# Patient Record
Sex: FEMALE | Race: WHITE | NOT HISPANIC OR LATINO
[De-identification: names, ages, dates, MRNs, and addresses within clinical notes are randomized per-mention and may not be internally consistent; named-entity substitution may affect disease eponyms.]

---

## 2017-03-28 PROBLEM — E53.8 VITAMIN B12 DEFICIENCY: Status: RESOLVED | Noted: 2017-03-28 | Resolved: 2017-03-28

## 2017-03-28 PROBLEM — E55.9 VITAMIN D DEFICIENCY: Status: RESOLVED | Noted: 2017-03-28 | Resolved: 2017-03-28

## 2017-03-28 PROBLEM — Z86.2 HISTORY OF ANEMIA: Status: RESOLVED | Noted: 2017-03-28 | Resolved: 2017-03-28

## 2017-03-28 PROBLEM — Z86.39 HISTORY OF HYPOTHYROIDISM: Status: RESOLVED | Noted: 2017-03-28 | Resolved: 2017-03-28

## 2017-03-28 PROBLEM — Z87.39 HISTORY OF OSTEOPOROSIS: Status: RESOLVED | Noted: 2017-03-28 | Resolved: 2017-03-28

## 2017-03-29 ENCOUNTER — APPOINTMENT (OUTPATIENT)
Dept: HEMATOLOGY ONCOLOGY | Facility: CLINIC | Age: 57
End: 2017-03-29

## 2017-03-29 VITALS
HEART RATE: 63 BPM | DIASTOLIC BLOOD PRESSURE: 75 MMHG | BODY MASS INDEX: 29.81 KG/M2 | OXYGEN SATURATION: 98 % | WEIGHT: 162 LBS | SYSTOLIC BLOOD PRESSURE: 137 MMHG | HEIGHT: 62 IN

## 2017-03-29 DIAGNOSIS — E53.8 DEFICIENCY OF OTHER SPECIFIED B GROUP VITAMINS: ICD-10-CM

## 2017-03-29 DIAGNOSIS — Z00.00 ENCOUNTER FOR GENERAL ADULT MEDICAL EXAMINATION W/OUT ABNORMAL FINDINGS: ICD-10-CM

## 2017-03-29 DIAGNOSIS — Z87.39 PERSONAL HISTORY OF OTHER DISEASES OF THE MUSCULOSKELETAL SYSTEM AND CONNECTIVE TISSUE: ICD-10-CM

## 2017-03-29 DIAGNOSIS — Z86.2 PERSONAL HISTORY OF DISEASES OF THE BLOOD AND BLOOD-FORMING ORGANS AND CERTAIN DISORDERS INVOLVING THE IMMUNE MECHANISM: ICD-10-CM

## 2017-03-29 DIAGNOSIS — E55.9 VITAMIN D DEFICIENCY, UNSPECIFIED: ICD-10-CM

## 2017-03-29 DIAGNOSIS — Z86.39 PERSONAL HISTORY OF OTHER ENDOCRINE, NUTRITIONAL AND METABOLIC DISEASE: ICD-10-CM

## 2017-03-29 RX ORDER — LEVOTHYROXINE SODIUM 25 UG/1
25 TABLET ORAL DAILY
Refills: 0 | Status: DISCONTINUED | COMMUNITY
End: 2017-03-29

## 2017-04-04 PROBLEM — Z00.00 ENCOUNTER FOR PREVENTIVE HEALTH EXAMINATION: Status: ACTIVE | Noted: 2017-03-20

## 2017-09-20 ENCOUNTER — APPOINTMENT (OUTPATIENT)
Dept: HEMATOLOGY ONCOLOGY | Facility: CLINIC | Age: 57
End: 2017-09-20
Payer: COMMERCIAL

## 2017-09-20 VITALS
TEMPERATURE: 99 F | HEIGHT: 61.97 IN | OXYGEN SATURATION: 100 % | HEART RATE: 70 BPM | BODY MASS INDEX: 29.44 KG/M2 | DIASTOLIC BLOOD PRESSURE: 83 MMHG | SYSTOLIC BLOOD PRESSURE: 115 MMHG | WEIGHT: 159.99 LBS | RESPIRATION RATE: 16 BRPM

## 2017-09-20 PROCEDURE — 99213 OFFICE O/P EST LOW 20 MIN: CPT

## 2018-03-12 ENCOUNTER — APPOINTMENT (OUTPATIENT)
Dept: HEMATOLOGY ONCOLOGY | Facility: CLINIC | Age: 58
End: 2018-03-12
Payer: COMMERCIAL

## 2018-03-12 VITALS
RESPIRATION RATE: 20 BRPM | BODY MASS INDEX: 29.44 KG/M2 | HEART RATE: 69 BPM | WEIGHT: 159.99 LBS | TEMPERATURE: 98.4 F | DIASTOLIC BLOOD PRESSURE: 68 MMHG | HEIGHT: 61.97 IN | OXYGEN SATURATION: 100 % | SYSTOLIC BLOOD PRESSURE: 143 MMHG

## 2018-03-12 DIAGNOSIS — Z80.41 FAMILY HISTORY OF MALIGNANT NEOPLASM OF OVARY: ICD-10-CM

## 2018-03-12 PROCEDURE — 99215 OFFICE O/P EST HI 40 MIN: CPT

## 2018-03-21 ENCOUNTER — APPOINTMENT (OUTPATIENT)
Dept: HEMATOLOGY ONCOLOGY | Facility: CLINIC | Age: 58
End: 2018-03-21

## 2018-03-28 ENCOUNTER — OTHER (OUTPATIENT)
Age: 58
End: 2018-03-28

## 2018-04-30 ENCOUNTER — APPOINTMENT (OUTPATIENT)
Dept: HEMATOLOGY ONCOLOGY | Facility: CLINIC | Age: 58
End: 2018-04-30
Payer: COMMERCIAL

## 2018-04-30 VITALS
DIASTOLIC BLOOD PRESSURE: 88 MMHG | RESPIRATION RATE: 20 BRPM | TEMPERATURE: 98.7 F | OXYGEN SATURATION: 99 % | WEIGHT: 161 LBS | HEIGHT: 61.97 IN | HEART RATE: 59 BPM | SYSTOLIC BLOOD PRESSURE: 136 MMHG | BODY MASS INDEX: 29.63 KG/M2

## 2018-04-30 PROCEDURE — 99214 OFFICE O/P EST MOD 30 MIN: CPT

## 2018-05-16 ENCOUNTER — OTHER (OUTPATIENT)
Age: 58
End: 2018-05-16

## 2018-06-07 ENCOUNTER — RX RENEWAL (OUTPATIENT)
Age: 58
End: 2018-06-07

## 2018-07-10 ENCOUNTER — APPOINTMENT (OUTPATIENT)
Dept: HEMATOLOGY ONCOLOGY | Facility: CLINIC | Age: 58
End: 2018-07-10
Payer: COMMERCIAL

## 2018-07-10 VITALS
TEMPERATURE: 99.4 F | BODY MASS INDEX: 29.44 KG/M2 | OXYGEN SATURATION: 97 % | RESPIRATION RATE: 16 BRPM | DIASTOLIC BLOOD PRESSURE: 76 MMHG | SYSTOLIC BLOOD PRESSURE: 124 MMHG | WEIGHT: 159.99 LBS | HEIGHT: 61.97 IN | HEART RATE: 68 BPM

## 2018-07-10 PROCEDURE — 99213 OFFICE O/P EST LOW 20 MIN: CPT

## 2018-09-06 ENCOUNTER — RX RENEWAL (OUTPATIENT)
Age: 58
End: 2018-09-06

## 2018-10-10 ENCOUNTER — APPOINTMENT (OUTPATIENT)
Dept: HEMATOLOGY ONCOLOGY | Facility: CLINIC | Age: 58
End: 2018-10-10
Payer: COMMERCIAL

## 2018-10-10 VITALS
SYSTOLIC BLOOD PRESSURE: 136 MMHG | HEART RATE: 59 BPM | TEMPERATURE: 99.5 F | BODY MASS INDEX: 28.71 KG/M2 | DIASTOLIC BLOOD PRESSURE: 79 MMHG | OXYGEN SATURATION: 98 % | HEIGHT: 61.97 IN | RESPIRATION RATE: 16 BRPM | WEIGHT: 156 LBS

## 2018-10-10 PROCEDURE — 99214 OFFICE O/P EST MOD 30 MIN: CPT

## 2018-10-10 RX ORDER — NAPROXEN 500 MG/1
500 TABLET ORAL
Refills: 0 | Status: DISCONTINUED | COMMUNITY
Start: 2017-03-29 | End: 2018-10-10

## 2018-12-06 ENCOUNTER — RX RENEWAL (OUTPATIENT)
Age: 58
End: 2018-12-06

## 2019-04-10 ENCOUNTER — APPOINTMENT (OUTPATIENT)
Dept: HEMATOLOGY ONCOLOGY | Facility: CLINIC | Age: 59
End: 2019-04-10
Payer: COMMERCIAL

## 2019-04-10 VITALS
SYSTOLIC BLOOD PRESSURE: 132 MMHG | HEART RATE: 63 BPM | TEMPERATURE: 98.6 F | RESPIRATION RATE: 16 BRPM | BODY MASS INDEX: 29.81 KG/M2 | HEIGHT: 61.97 IN | OXYGEN SATURATION: 98 % | WEIGHT: 162 LBS | DIASTOLIC BLOOD PRESSURE: 83 MMHG

## 2019-04-10 PROCEDURE — 99214 OFFICE O/P EST MOD 30 MIN: CPT

## 2019-04-10 RX ORDER — EXEMESTANE 25 MG/1
25 TABLET, FILM COATED ORAL DAILY
Qty: 30 | Refills: 1 | Status: COMPLETED | COMMUNITY
Start: 2018-03-12 | End: 2019-04-10

## 2019-04-10 RX ORDER — SYRINGE WITH NEEDLE, 1 ML 25GX5/8"
25G X 5/8" SYRINGE, EMPTY DISPOSABLE MISCELLANEOUS
Qty: 12 | Refills: 3 | Status: COMPLETED | COMMUNITY
Start: 2017-03-30 | End: 2019-04-10

## 2019-04-10 NOTE — REASON FOR VISIT
[Follow-Up Visit] : a follow-up [FreeTextEntry2] : B12 deficiency, Liver Lesion, Osteopenia, LCIS/ ADH

## 2019-04-10 NOTE — HISTORY OF PRESENT ILLNESS
[Disease: _____________________] : Disease: [unfilled] [de-identified] : Patient is a 56 year old COA f/u for a liver lesion and osteopenia. She has been taking Vitamin B12 for B12 deficiency. She has a histroy of Iron Deficiency Anemia s/p IV iron. \par \par Patient underwent lumpectomy- found to have right breast density.  Breast biopsy revealed ADH.  She underwent excisional biopsy that showed ADH and LCIS.  [de-identified] : Patient presents today for follow up of LCIS/ADH B12 deficiency liver lesion and osteopenia- remains on Exemestane  Pt states she is feeing well offers no complaints.  She is s/p lithotripsy for kidney stones.

## 2019-04-10 NOTE — RESULTS/DATA
[FreeTextEntry1] : 4/18 Bone Density stable osteopenia.\par 5/18 MRI Brain\par \par 7/10/18:\par \par CEA 0.7   CA27-29 20.0 VIT D 38.8 Vit B12 471\par \par WBC 6.5 hemoglobin 12.8 hematocrit 30.7 platelets 356,000

## 2019-04-10 NOTE — CONSULT LETTER
[Dear  ___] : Dear  [unfilled], [Consult Letter:] : I had the pleasure of evaluating your patient, [unfilled]. [Please see my note below.] : Please see my note below. [Consult Closing:] : Thank you very much for allowing me to participate in the care of this patient.  If you have any questions, please do not hesitate to contact me. [Sincerely,] : Sincerely, [FreeTextEntry3] : Judy Jensen MD\par [facility] [place] at [facility]\par

## 2019-04-10 NOTE — REVIEW OF SYSTEMS
[Negative] : Allergic/Immunologic [Fever] : no fever [Fatigue] : no fatigue [Recent Change In Weight] : ~T no recent weight change [Eye Pain] : no eye pain [Vision Problems] : no vision problems [Hoarseness] : no hoarseness [Dysphagia] : no dysphagia [Mucosal Pain] : no mucosal pain [Chest Pain] : no chest pain [Lower Ext Edema] : no lower extremity edema [Cough] : no cough [SOB on Exertion] : no shortness of breath during exertion [Abdominal Pain] : no abdominal pain [Constipation] : no constipation [Diarrhea] : no diarrhea [Dysuria] : no dysuria [Joint Pain] : no joint pain [Muscle Pain] : no muscle pain [Skin Rash] : no skin rash [Dizziness] : no dizziness [Fainting] : no fainting [Insomnia] : no insomnia [Anxiety] : no anxiety [Depression] : no depression [Hot Flashes] : no hot flashes [Muscle Weakness] : no muscle weakness [Easy Bleeding] : no tendency for easy bleeding [Easy Bruising] : no tendency for easy bruising

## 2019-04-10 NOTE — ASSESSMENT
[FreeTextEntry1] : 58 year old female with  LCIS with ADH.  \par - on exemestane, occasional muscle twinges - change to Anastrozole \par - due for mammogram q 6m\par - Discussed weight control and healthy eating habits.  Encourage to participate in moderate exercise.\par \par - Ongoing surveillance with imaging studies.\par \par -  B12 deficiency - currently 471, continue B12 1000mcg Q 4 weeks SQ.  No response to PO in the past\par \par - Osteopenia - --1.8 in hip area.\par weight-bearing exercises, calcium and vitamin D\par Repeat bone density in 2 years.\par \par Liver lesion - benign\par 6 m follow up \par \par \par C/o decline in memory\par \par Platelets 427 today - repeat in 6m\par \par \par \par \par \par \par \par

## 2019-04-10 NOTE — PHYSICAL EXAM
[Fully active, able to carry on all pre-disease performance without restriction] : Status 0 - Fully active, able to carry on all pre-disease performance without restriction [Normal] : affect appropriate [de-identified] : right breast lumpectomy scar

## 2019-10-09 ENCOUNTER — RESULT REVIEW (OUTPATIENT)
Age: 59
End: 2019-10-09

## 2019-10-09 ENCOUNTER — APPOINTMENT (OUTPATIENT)
Dept: HEMATOLOGY ONCOLOGY | Facility: CLINIC | Age: 59
End: 2019-10-09
Payer: COMMERCIAL

## 2019-10-09 VITALS
TEMPERATURE: 99.5 F | WEIGHT: 170.99 LBS | RESPIRATION RATE: 16 BRPM | DIASTOLIC BLOOD PRESSURE: 85 MMHG | HEIGHT: 61.97 IN | HEART RATE: 66 BPM | OXYGEN SATURATION: 99 % | BODY MASS INDEX: 31.47 KG/M2 | SYSTOLIC BLOOD PRESSURE: 135 MMHG

## 2019-10-09 PROCEDURE — 99214 OFFICE O/P EST MOD 30 MIN: CPT

## 2019-10-09 NOTE — ASSESSMENT
[FreeTextEntry1] : 58 year old female with  LCIS with ADH 2017  \par - on exemestane, occasional muscle twinges - better on  Anastrozole \par - due for mammogram q 6m\par - genetic testing negative - repeat NEXT TIME\par - Ongoing surveillance with imaging studies.\par \par -  B12 deficiency - currently 471, continue B12 1000mcg Q 4 weeks SQ.  No response to PO in the past\par \par - Osteopenia - .8 in hip area.\par weight-bearing exercises, calcium and vitamin D\par Repeat bone density in 2020 \par \par Liver lesion - benign\par US in the past stable \par \par C/o decline in memory\par \par Platelets 427 today - repeat in 6m, 336 today \par \par Mother- multiple myeloma, ovarian cancer\par Maternal uncle - kidney failure\par Father - no cancer\par Paternal uncle-  at 17\par Paternal uncle -Charcot- Juanita Tooth \par Maternal GF- throat ca ( smoker)\par Maternal GM -  93 \par \par \par \par \par \par \par \par

## 2019-10-09 NOTE — PHYSICAL EXAM
[Fully active, able to carry on all pre-disease performance without restriction] : Status 0 - Fully active, able to carry on all pre-disease performance without restriction [Normal] : affect appropriate [de-identified] : right breast lumpectomy scar

## 2019-10-09 NOTE — REVIEW OF SYSTEMS
[Negative] : Allergic/Immunologic [Fatigue] : fatigue [Fever] : no fever [Recent Change In Weight] : ~T no recent weight change [Eye Pain] : no eye pain [Vision Problems] : no vision problems [Dysphagia] : no dysphagia [Mucosal Pain] : no mucosal pain [Chest Pain] : no chest pain [Hoarseness] : no hoarseness [Cough] : no cough [Lower Ext Edema] : no lower extremity edema [Abdominal Pain] : no abdominal pain [SOB on Exertion] : no shortness of breath during exertion [Constipation] : no constipation [Diarrhea] : no diarrhea [Dysuria] : no dysuria [Joint Pain] : no joint pain [Muscle Pain] : no muscle pain [Skin Rash] : no skin rash [Dizziness] : no dizziness [Fainting] : no fainting [Insomnia] : no insomnia [Anxiety] : no anxiety [Hot Flashes] : no hot flashes [Depression] : no depression [Muscle Weakness] : no muscle weakness [Easy Bleeding] : no tendency for easy bleeding [Easy Bruising] : no tendency for easy bruising

## 2019-10-09 NOTE — HISTORY OF PRESENT ILLNESS
[Disease: _____________________] : Disease: [unfilled] [de-identified] : Patient is a 56 year old COA f/u for a liver lesion and osteopenia. She has been taking Vitamin B12 for B12 deficiency. She has a histroy of Iron Deficiency Anemia s/p IV iron. \par \par Patient underwent lumpectomy- found to have right breast density.  Breast biopsy revealed ADH.  She underwent excisional biopsy that showed ADH and LCIS.  [de-identified] : Patient presents today for follow up of LCIS/ADH B12 deficiency liver lesion and osteopenia- remains on Exemestane  Pt states she is feeing well has some ongoing fatigue.\par Kidney stone s/p lithotripsy

## 2019-10-09 NOTE — CONSULT LETTER
[Dear  ___] : Dear  [unfilled], [Consult Letter:] : I had the pleasure of evaluating your patient, [unfilled]. [Please see my note below.] : Please see my note below. [Consult Closing:] : Thank you very much for allowing me to participate in the care of this patient.  If you have any questions, please do not hesitate to contact me. [Sincerely,] : Sincerely, [FreeTextEntry3] : Judy Jensen MD\par [facility] [place] at [facility]\par

## 2019-12-02 ENCOUNTER — RX RENEWAL (OUTPATIENT)
Age: 59
End: 2019-12-02

## 2020-04-15 ENCOUNTER — APPOINTMENT (OUTPATIENT)
Dept: HEMATOLOGY ONCOLOGY | Facility: CLINIC | Age: 60
End: 2020-04-15

## 2020-06-23 ENCOUNTER — RESULT REVIEW (OUTPATIENT)
Age: 60
End: 2020-06-23

## 2020-06-23 ENCOUNTER — APPOINTMENT (OUTPATIENT)
Dept: HEMATOLOGY ONCOLOGY | Facility: CLINIC | Age: 60
End: 2020-06-23
Payer: COMMERCIAL

## 2020-06-23 VITALS
TEMPERATURE: 98 F | BODY MASS INDEX: 30.69 KG/M2 | WEIGHT: 166.8 LBS | SYSTOLIC BLOOD PRESSURE: 135 MMHG | OXYGEN SATURATION: 99 % | RESPIRATION RATE: 18 BRPM | DIASTOLIC BLOOD PRESSURE: 70 MMHG | HEIGHT: 61.97 IN | HEART RATE: 61 BPM

## 2020-06-23 PROCEDURE — 99214 OFFICE O/P EST MOD 30 MIN: CPT

## 2020-06-23 NOTE — PHYSICAL EXAM
[Fully active, able to carry on all pre-disease performance without restriction] : Status 0 - Fully active, able to carry on all pre-disease performance without restriction [Normal] : grossly intact [de-identified] : right breast lumpectomy scar

## 2020-06-23 NOTE — REVIEW OF SYSTEMS
[Negative] : Heme/Lymph [Anxiety] : anxiety [Fatigue] : no fatigue [Fever] : no fever [Eye Pain] : no eye pain [Recent Change In Weight] : ~T no recent weight change [Vision Problems] : no vision problems [Dysphagia] : no dysphagia [Hoarseness] : no hoarseness [Mucosal Pain] : no mucosal pain [Lower Ext Edema] : no lower extremity edema [Chest Pain] : no chest pain [Cough] : no cough [Abdominal Pain] : no abdominal pain [SOB on Exertion] : no shortness of breath during exertion [Diarrhea] : no diarrhea [Constipation] : no constipation [Dysuria] : no dysuria [Muscle Pain] : no muscle pain [Joint Pain] : no joint pain [Dizziness] : no dizziness [Skin Rash] : no skin rash [Fainting] : no fainting [Depression] : no depression [Insomnia] : no insomnia [Hot Flashes] : no hot flashes [Easy Bruising] : no tendency for easy bruising [Easy Bleeding] : no tendency for easy bleeding [Muscle Weakness] : no muscle weakness [de-identified] : C/o decline in memory some fogginess  [de-identified] : work related

## 2020-06-23 NOTE — HISTORY OF PRESENT ILLNESS
[Disease: _____________________] : Disease: [unfilled] [de-identified] : Patient is a 56 year old COA f/u for a liver lesion and osteopenia. She has been taking Vitamin B12 for B12 deficiency. She has a histroy of Iron Deficiency Anemia s/p IV iron. \par \par Patient underwent lumpectomy- found to have right breast density.  Breast biopsy revealed ADH.  She underwent excisional biopsy that showed ADH and LCIS.  [de-identified] : Patient presents today for follow up of LCIS/ADH B12 deficiency liver lesion and osteopenia- remains on Exemestane  Pt states she is feeing well offers no complaints.  \par

## 2020-06-23 NOTE — ASSESSMENT
[FreeTextEntry1] : 60 year old female with  LCIS with ADH.  \par - on exemestane, occasional muscle twinges - change to Anastrozole \par - mammogram last 1/2020\par - Discussed weight control and healthy eating habits.  Encourage to participate in moderate exercise.\par \par - Ongoing surveillance with imaging studies.\par \par -  B12 deficiency - currently 471, continue B12 1000mcg Q 4 weeks SQ.  No response to PO in the past\par \par - Osteopenia - 1.8 in hip area, -2.3 lumbar spine.\par weight-bearing exercises, calcium and vitamin D\par last bone density 4/18- due now 4/2020\par \par Liver lesion - benign\par last US 1/2018\par \par

## 2020-06-23 NOTE — CONSULT LETTER
[Dear  ___] : Dear  [unfilled], [Please see my note below.] : Please see my note below. [Consult Letter:] : I had the pleasure of evaluating your patient, [unfilled]. [Consult Closing:] : Thank you very much for allowing me to participate in the care of this patient.  If you have any questions, please do not hesitate to contact me. [Sincerely,] : Sincerely, [FreeTextEntry3] : Judy Jensen MD\par [facility] [place] at [facility]\par

## 2020-12-01 ENCOUNTER — RESULT REVIEW (OUTPATIENT)
Age: 60
End: 2020-12-01

## 2020-12-01 ENCOUNTER — APPOINTMENT (OUTPATIENT)
Dept: HEMATOLOGY ONCOLOGY | Facility: CLINIC | Age: 60
End: 2020-12-01
Payer: COMMERCIAL

## 2020-12-01 VITALS
HEIGHT: 61.97 IN | BODY MASS INDEX: 30.54 KG/M2 | OXYGEN SATURATION: 97 % | SYSTOLIC BLOOD PRESSURE: 129 MMHG | DIASTOLIC BLOOD PRESSURE: 77 MMHG | HEART RATE: 72 BPM | WEIGHT: 165.99 LBS | TEMPERATURE: 97.5 F | RESPIRATION RATE: 20 BRPM

## 2020-12-01 DIAGNOSIS — Z13.79 ENCOUNTER FOR OTHER SCREENING FOR GENETIC AND CHROMOSOMAL ANOMALIES: ICD-10-CM

## 2020-12-01 PROCEDURE — 99214 OFFICE O/P EST MOD 30 MIN: CPT

## 2020-12-01 PROCEDURE — 99072 ADDL SUPL MATRL&STAF TM PHE: CPT

## 2020-12-01 NOTE — REVIEW OF SYSTEMS
[Anxiety] : anxiety [Negative] : Allergic/Immunologic [Fever] : no fever [Fatigue] : no fatigue [Recent Change In Weight] : ~T no recent weight change [Eye Pain] : no eye pain [Vision Problems] : no vision problems [Dysphagia] : no dysphagia [Hoarseness] : no hoarseness [Mucosal Pain] : no mucosal pain [Chest Pain] : no chest pain [Lower Ext Edema] : no lower extremity edema [Cough] : no cough [SOB on Exertion] : no shortness of breath during exertion [Abdominal Pain] : no abdominal pain [Constipation] : no constipation [Diarrhea] : no diarrhea [Dysuria] : no dysuria [Joint Pain] : no joint pain [Muscle Pain] : no muscle pain [Skin Rash] : no skin rash [Dizziness] : no dizziness [Fainting] : no fainting [Insomnia] : no insomnia [Depression] : no depression [Hot Flashes] : no hot flashes [Muscle Weakness] : no muscle weakness [Easy Bleeding] : no tendency for easy bleeding [Easy Bruising] : no tendency for easy bruising [de-identified] : C/o decline in memory some fogginess  [de-identified] : work related

## 2020-12-01 NOTE — CONSULT LETTER
[Dear  ___] : Dear  [unfilled], [Consult Letter:] : I had the pleasure of evaluating your patient, [unfilled]. [Please see my note below.] : Please see my note below. [Consult Closing:] : Thank you very much for allowing me to participate in the care of this patient.  If you have any questions, please do not hesitate to contact me. [Sincerely,] : Sincerely, [FreeTextEntry3] : Judy Jensen MD\par [facility] [place] at [facility]\par

## 2020-12-01 NOTE — ASSESSMENT
[FreeTextEntry1] : 60 year old female with  LCIS with ADH.  2017\par - on exemestane, occasional muscle twinges - change to Anastrozole \par - mammogram last 2020- due now \par - Discussed weight control and healthy eating habits.  Encourage to participate in moderate exercise.\par - gabapentin - 600 mg \par - Ongoing surveillance with imaging studies.\par \par -  B12 deficiency - currently 471, continue B12 1000mcg Q 4 weeks SQ.  No response to PO in the past\par \par Osteoporosis last bone density  2020\par T-score- 2.5 \par Risk factors\par Recommended:\par 1. Vitamin D\par 2. Calcium supplement 500mg\par 3. Weight bearing exercises\par 4. Prolia resume. \par Dental cleaning - regular \par \par \par weight-bearing exercises, calcium and vitamin D\par last bone density - due now 2020\par \par Liver lesion - benign, fatty liver\par last US 2018\par \par Genetic testing \par - [place]\par Patient wit LCIS age 57\par Mother - multiple myeloma - age 70, ovarian ca\par Father -  39 - accident\par Paternal uncle- age 60 Charcot- Juanita - Tooth syndrome \par Maternal uncle - CVA age 40,  young\par Maternal GF - laryngeal ca, smoker \par Brother - colon cancer age 40 \par

## 2020-12-01 NOTE — HISTORY OF PRESENT ILLNESS
[Disease: _____________________] : Disease: [unfilled] [de-identified] : Patient is a 60 year old COA f/u for a liver lesion and osteopenia. She has been taking Vitamin B12 for B12 deficiency. She has a history of Iron Deficiency Anemia s/p IV iron. \par \par Patient underwent lumpectomy in 2017 - found to have right breast density.  Breast biopsy revealed ADH.  She underwent excisional biopsy that showed ADH and LCIS.  [de-identified] : Patient presents today for follow up of LCIS/ADH B12 deficiency liver lesion and osteopenia- remains on Exemestane. Pt states she is feeing well offers no complaints.  Bone density done 7/2020. US abd done 7/2020.\par

## 2020-12-01 NOTE — PHYSICAL EXAM
[Fully active, able to carry on all pre-disease performance without restriction] : Status 0 - Fully active, able to carry on all pre-disease performance without restriction [Normal] : affect appropriate [de-identified] : right breast lumpectomy scar

## 2020-12-15 ENCOUNTER — RX RENEWAL (OUTPATIENT)
Age: 60
End: 2020-12-15

## 2021-01-21 RX ORDER — CYANOCOBALAMIN 1000 UG/ML
1000 INJECTION INTRAMUSCULAR; SUBCUTANEOUS
Qty: 12 | Refills: 0 | Status: ACTIVE | COMMUNITY
Start: 2017-03-30 | End: 1900-01-01

## 2021-06-10 ENCOUNTER — RESULT REVIEW (OUTPATIENT)
Age: 61
End: 2021-06-10

## 2021-06-10 ENCOUNTER — APPOINTMENT (OUTPATIENT)
Dept: HEMATOLOGY ONCOLOGY | Facility: CLINIC | Age: 61
End: 2021-06-10
Payer: COMMERCIAL

## 2021-06-10 VITALS
DIASTOLIC BLOOD PRESSURE: 63 MMHG | HEIGHT: 61.97 IN | OXYGEN SATURATION: 99 % | WEIGHT: 159 LBS | TEMPERATURE: 97.5 F | RESPIRATION RATE: 16 BRPM | BODY MASS INDEX: 29.26 KG/M2 | SYSTOLIC BLOOD PRESSURE: 132 MMHG | HEART RATE: 64 BPM

## 2021-06-10 PROCEDURE — 99214 OFFICE O/P EST MOD 30 MIN: CPT

## 2021-06-10 PROCEDURE — 99072 ADDL SUPL MATRL&STAF TM PHE: CPT

## 2021-06-10 NOTE — HISTORY OF PRESENT ILLNESS
[Disease: _____________________] : Disease: [unfilled] [de-identified] : Patient is a 60 year old COA f/u for a liver lesion and osteopenia. She has been taking Vitamin B12 for B12 deficiency. She has a history of Iron Deficiency Anemia s/p IV iron. \par \par Patient underwent lumpectomy in 2017 - found to have right breast density.  Breast biopsy revealed ADH.  She underwent excisional biopsy that showed ADH and LCIS.  [de-identified] : Patient presents today for follow up of LCIS/ADH B12 deficiency liver lesion and osteopenia- remains on Exemestane. Pt states she is feeing well offers no complaints.  Bone density done 7/2020. US abd done 7/2020.\par Patient received prolia in Dec 2020, patient states that she was having severe pain in the mouth and back of the knees post shot.\par  \par Patient also had a mammogram and ultrasound in Feb, 2021, which showed that the lymph node were swollen.

## 2021-06-10 NOTE — ASSESSMENT
[FreeTextEntry1] : 60 year old female with  LCIS with ADH.  2017\par - on exemestane, occasional muscle twinges - change to Anastrozole \par - mammogram last 2020- due now \par - Discussed weight control and healthy eating habits.  Encourage to participate in moderate exercise.\par - gabapentin - 600 mg \par - Ongoing surveillance with imaging studies.\par \par -  B12 deficiency - currently 471, continue B12 1000mcg Q 4 weeks SQ.  No response to PO in the past\par \par Osteoporosis last bone density  2020\par T-score- 2.5 \par Risk factors\par Recommended:\par 1. Vitamin D\par 2. Calcium supplement 500mg\par 3. Weight bearing exercises\par 4. Prolia resume. \par Dental cleaning - regular \par \par weight-bearing exercises, calcium and vitamin D\par last bone density - due now 2020\par \par Liver lesion - benign, fatty liver\par last US 2018\par \par Genetic testing \par - Indonesian\par Patient wit LCIS age 57\par Mother - multiple myeloma - age 70, ovarian ca ? \par Father -  39 - accident\par Paternal uncle- age 60 Charcot- Juanita - Tooth syndrome \par Maternal uncle - CVA age 40,  young\par Maternal GF - laryngeal ca, smoker \par Brother - colon cancer age 40 \par \par 6/10/2021\par Patient with tingling sensation in the tongue after Prolia for few weeks, followed by tingling sensation after COVID vaccine ( Moderna).  She was not taking calcium.  Start Calcium \par Genetic testing reviewed with patient - [facility] results with SMARCA4 VUS, patient referred to genetic counselor.   \par Sonogram 2020 liver stable \par LCIS - on Arimidex, continue, dexa 2020. Mammogram- 2021\par

## 2021-06-10 NOTE — REVIEW OF SYSTEMS
[Anxiety] : anxiety [Negative] : Musculoskeletal [Fever] : no fever [Fatigue] : no fatigue [Recent Change In Weight] : ~T no recent weight change [Eye Pain] : no eye pain [Vision Problems] : no vision problems [Hoarseness] : no hoarseness [Dysphagia] : no dysphagia [Mucosal Pain] : no mucosal pain [Chest Pain] : no chest pain [Lower Ext Edema] : no lower extremity edema [Cough] : no cough [SOB on Exertion] : no shortness of breath during exertion [Abdominal Pain] : no abdominal pain [Constipation] : no constipation [Diarrhea] : no diarrhea [Dysuria] : no dysuria [Joint Pain] : no joint pain [Muscle Pain] : no muscle pain [Skin Rash] : no skin rash [Dizziness] : no dizziness [Fainting] : no fainting [Insomnia] : no insomnia [Depression] : no depression [Hot Flashes] : no hot flashes [Muscle Weakness] : no muscle weakness [Easy Bleeding] : no tendency for easy bleeding [Easy Bruising] : no tendency for easy bruising [de-identified] : work related

## 2021-06-10 NOTE — CONSULT LETTER
[Dear  ___] : Dear  [unfilled], [Consult Letter:] : I had the pleasure of evaluating your patient, [unfilled]. [Please see my note below.] : Please see my note below. [Consult Closing:] : Thank you very much for allowing me to participate in the care of this patient.  If you have any questions, please do not hesitate to contact me. [Sincerely,] : Sincerely, [FreeTextEntry3] : Judy Jensen MD\par [facility] [place] at [facility]\par

## 2021-06-10 NOTE — PHYSICAL EXAM
[Fully active, able to carry on all pre-disease performance without restriction] : Status 0 - Fully active, able to carry on all pre-disease performance without restriction [Normal] : affect appropriate [de-identified] : right breast lumpectomy scar

## 2021-07-07 ENCOUNTER — APPOINTMENT (OUTPATIENT)
Dept: HEMATOLOGY ONCOLOGY | Facility: CLINIC | Age: 61
End: 2021-07-07

## 2021-12-16 ENCOUNTER — RESULT REVIEW (OUTPATIENT)
Age: 61
End: 2021-12-16

## 2021-12-16 ENCOUNTER — APPOINTMENT (OUTPATIENT)
Dept: HEMATOLOGY ONCOLOGY | Facility: CLINIC | Age: 61
End: 2021-12-16
Payer: COMMERCIAL

## 2021-12-16 VITALS
SYSTOLIC BLOOD PRESSURE: 128 MMHG | HEIGHT: 61.97 IN | WEIGHT: 158.5 LBS | RESPIRATION RATE: 16 BRPM | OXYGEN SATURATION: 98 % | TEMPERATURE: 98.6 F | HEART RATE: 67 BPM | BODY MASS INDEX: 29.17 KG/M2 | DIASTOLIC BLOOD PRESSURE: 71 MMHG

## 2021-12-16 DIAGNOSIS — M25.562 PAIN IN RIGHT HIP: ICD-10-CM

## 2021-12-16 DIAGNOSIS — M25.552 PAIN IN RIGHT HIP: ICD-10-CM

## 2021-12-16 DIAGNOSIS — G89.29 PAIN IN RIGHT HIP: ICD-10-CM

## 2021-12-16 DIAGNOSIS — M25.551 PAIN IN RIGHT HIP: ICD-10-CM

## 2021-12-16 DIAGNOSIS — M25.561 PAIN IN RIGHT HIP: ICD-10-CM

## 2021-12-16 PROCEDURE — 36415 COLL VENOUS BLD VENIPUNCTURE: CPT

## 2021-12-16 PROCEDURE — 99214 OFFICE O/P EST MOD 30 MIN: CPT

## 2021-12-16 NOTE — PHYSICAL EXAM
[Fully active, able to carry on all pre-disease performance without restriction] : Status 0 - Fully active, able to carry on all pre-disease performance without restriction [Normal] : affect appropriate [de-identified] : right breast lumpectomy scar

## 2021-12-16 NOTE — ASSESSMENT
[FreeTextEntry1] : 61 year old female with  LCIS with ADH.  2017\par - on exemestane, occasional muscle twinges - change to Anastrozole \par - mammogram last 2020- due now \par - Discussed weight control and healthy eating habits.  Encourage to participate in moderate exercise.\par - gabapentin - 600 mg \par - Ongoing surveillance with imaging studies.\par \par -  B12 deficiency - check level, continue B12 1000mcg Q 4 weeks SQ.  No response to PO in the past\par \par Osteoporosis last bone density  2020\par T-score- 2.5 \par Risk factors\par Recommended:\par 1. Vitamin D\par 2. Calcium supplement 500mg\par 3. Weight bearing exercises\par 4. Prolia resume. , 2021, 2021\par Dental cleaning - regular \par \par weight-bearing exercises, calcium and vitamin D\par last bone density - due now 2020\par \par Liver lesion - benign, fatty liver\par last US 10/21- reviewed \par \par Genetic testing \par - [place]ag[place]\par Patient wit LCIS age 57\par Mother - multiple myeloma - age 70, ovarian ca ? \par Father -  39 - accident\par Paternal uncle- age 60 Charcot- Juanita - Tooth syndrome \par Maternal uncle - CVA age 40,  young\par Maternal GF - laryngeal ca, smoker \par Brother - colon cancer age 40 \par \par Patient with tingling sensation in the tongue after Prolia for few weeks, followed by tingling sensation after COVID vaccine ( Moderna).  She was not taking calcium.  Start Calcium \par Genetic testing reviewed with patient - [facility] results with SMARCA4 VUS, patient referred to genetic counselor.   \par \par \par

## 2021-12-16 NOTE — HISTORY OF PRESENT ILLNESS
[Disease: _____________________] : Disease: [unfilled] [de-identified] : Patient is a 60 year old COA f/u for a liver lesion and osteopenia. She has been taking Vitamin B12 for B12 deficiency. She has a history of Iron Deficiency Anemia s/p IV iron. \par \par Patient underwent lumpectomy in 2017 - found to have right breast density.  Breast biopsy revealed ADH.  She underwent excisional biopsy that showed ADH and LCIS.  [de-identified] : Patient presents today for follow up of LCIS/ADH B12 deficiency liver lesion and osteopenia- remains on Exemestane. Pt states she is feeing well offers no complaints.  Bone density done 7/2020. US abd done 7/2020.\par Patient received prolia in Dec 2020, patient states that she was having severe pain in the mouth and back of the knees post shot.\par  \par Patient also had a mammogram and ultrasound in Feb, 2021, which showed that the lymph node were swollen.

## 2021-12-16 NOTE — CONSULT LETTER
[Dear  ___] : Dear  [unfilled], [Consult Letter:] : I had the pleasure of evaluating your patient, [unfilled]. [Please see my note below.] : Please see my note below. [Consult Closing:] : Thank you very much for allowing me to participate in the care of this patient.  If you have any questions, please do not hesitate to contact me. [Sincerely,] : Sincerely, [FreeTextEntry3] : Judy Jensen MD\par [facility] [place] at [facility]\par

## 2021-12-16 NOTE — REVIEW OF SYSTEMS
[Anxiety] : anxiety [Negative] : Allergic/Immunologic [Fever] : no fever [Fatigue] : no fatigue [Recent Change In Weight] : ~T no recent weight change [Eye Pain] : no eye pain [Vision Problems] : no vision problems [Dysphagia] : no dysphagia [Hoarseness] : no hoarseness [Mucosal Pain] : no mucosal pain [Chest Pain] : no chest pain [Lower Ext Edema] : no lower extremity edema [Cough] : no cough [SOB on Exertion] : no shortness of breath during exertion [Abdominal Pain] : no abdominal pain [Constipation] : no constipation [Diarrhea] : no diarrhea [Dysuria] : no dysuria [Joint Pain] : no joint pain [Muscle Pain] : no muscle pain [Skin Rash] : no skin rash [Dizziness] : no dizziness [Fainting] : no fainting [Insomnia] : no insomnia [Depression] : no depression [Hot Flashes] : no hot flashes [Muscle Weakness] : no muscle weakness [Easy Bleeding] : no tendency for easy bleeding [Easy Bruising] : no tendency for easy bruising [de-identified] : work related

## 2022-06-16 ENCOUNTER — RESULT REVIEW (OUTPATIENT)
Age: 62
End: 2022-06-16

## 2022-06-16 ENCOUNTER — APPOINTMENT (OUTPATIENT)
Dept: HEMATOLOGY ONCOLOGY | Facility: CLINIC | Age: 62
End: 2022-06-16
Payer: COMMERCIAL

## 2022-06-16 VITALS
DIASTOLIC BLOOD PRESSURE: 72 MMHG | RESPIRATION RATE: 16 BRPM | WEIGHT: 157.13 LBS | BODY MASS INDEX: 28.91 KG/M2 | TEMPERATURE: 98.7 F | SYSTOLIC BLOOD PRESSURE: 141 MMHG | OXYGEN SATURATION: 99 % | HEIGHT: 61.97 IN | HEART RATE: 55 BPM

## 2022-06-16 PROCEDURE — 99214 OFFICE O/P EST MOD 30 MIN: CPT | Mod: 25

## 2022-06-16 PROCEDURE — 36415 COLL VENOUS BLD VENIPUNCTURE: CPT

## 2022-06-16 RX ORDER — TACROLIMUS 1 MG/G
0.1 OINTMENT TOPICAL
Qty: 60 | Refills: 0 | Status: COMPLETED | COMMUNITY
Start: 2022-01-19

## 2022-06-16 RX ORDER — METRONIDAZOLE 7.5 MG/G
0.75 CREAM TOPICAL
Qty: 45 | Refills: 0 | Status: COMPLETED | COMMUNITY
Start: 2022-01-19

## 2022-06-16 RX ORDER — OXYMETAZOLINE HYDROCHLORIDE 1 G/100G
1 CREAM TOPICAL
Qty: 30 | Refills: 0 | Status: COMPLETED | COMMUNITY
Start: 2022-01-19

## 2022-06-18 NOTE — HISTORY OF PRESENT ILLNESS
[Disease: _____________________] : Disease: [unfilled] [de-identified] : Patient is a 60 year old COA f/u for a liver lesion and osteopenia. She has been taking Vitamin B12 for B12 deficiency. She has a history of Iron Deficiency Anemia s/p IV iron. \par \par Patient underwent lumpectomy in 2017 - found to have right breast density.  Breast biopsy revealed ADH.  She underwent excisional biopsy that showed ADH and LCIS.  [de-identified] : Patient presents today for follow up of LCIS/ADH B12 deficiency liver lesion and osteopenia- remains on Anastrozole. Patient offers no physical complaints at this time.

## 2022-06-18 NOTE — ASSESSMENT
[FreeTextEntry1] : 62 year old female with  LCIS with ADH.  2017\par - on exemestane, occasional muscle twinges - change to Anastrozole \par - mammogram last  2022\par - Discussed weight control and healthy eating habits.  Encourage to participate in moderate exercise.\par - gabapentin - 600 mg \par - Ongoing surveillance with imaging studies.\par \par -  B12 deficiency - check level, continue B12 1000mcg Q 4 weeks SQ.  No response to PO in the past\par \par Osteoporosis last bone density  2020\par T-score- 2.5 \par Risk factors\par Recommended:\par 1. Vitamin D\par 2. Calcium supplement 500mg\par 3. Weight bearing exercises\par 4. Prolia resume. , 2021, 2021, 2022- hold, right jaw pain, might need wisdom tooth removal\par Dental cleaning - regular weight-bearing exercises, calcium and vitamin D\par last bone density - due now \par \par Liver lesion - benign, fatty liver\par last US 10/21- reviewed \par \par Genetic testing \par - [place]\par Patient wit LCIS age 57\par Mother - multiple myeloma - age 70, ovarian ca ? \par Father -  39 - accident\par Paternal uncle- age 60 Charcot- Juanita - Tooth syndrome \par Maternal uncle - CVA age 40,  young\par Maternal GF - laryngeal ca, smoker \par Brother - colon cancer age 40 \par \par Patient with tingling sensation in the tongue after Prolia for few weeks, followed by tingling sensation after COVID vaccine ( Moderna).  She was not taking calcium.  Start Calcium \par Genetic testing reviewed with patient - [facility] results with SMARCA4 VUS, patient evaluated by genetic counselor.   \par \par \par

## 2022-06-18 NOTE — CONSULT LETTER
[Dear  ___] : Dear  [unfilled], [Consult Letter:] : I had the pleasure of evaluating your patient, [unfilled]. [Please see my note below.] : Please see my note below. [Consult Closing:] : Thank you very much for allowing me to participate in the care of this patient.  If you have any questions, please do not hesitate to contact me. [Sincerely,] : Sincerely, [FreeTextEntry3] : Judy Jensen MD\par [facility] [place] at [facility]\par

## 2022-06-18 NOTE — PHYSICAL EXAM
[Fully active, able to carry on all pre-disease performance without restriction] : Status 0 - Fully active, able to carry on all pre-disease performance without restriction [Normal] : affect appropriate [de-identified] : right breast lumpectomy scar

## 2022-09-20 ENCOUNTER — RESULT REVIEW (OUTPATIENT)
Age: 62
End: 2022-09-20

## 2022-09-20 ENCOUNTER — APPOINTMENT (OUTPATIENT)
Dept: HEMATOLOGY ONCOLOGY | Facility: CLINIC | Age: 62
End: 2022-09-20

## 2022-09-20 VITALS
SYSTOLIC BLOOD PRESSURE: 133 MMHG | RESPIRATION RATE: 16 BRPM | TEMPERATURE: 98.2 F | HEIGHT: 61.97 IN | WEIGHT: 160.6 LBS | DIASTOLIC BLOOD PRESSURE: 70 MMHG | HEART RATE: 64 BPM | BODY MASS INDEX: 29.55 KG/M2 | OXYGEN SATURATION: 99 %

## 2022-09-20 PROCEDURE — 99213 OFFICE O/P EST LOW 20 MIN: CPT | Mod: 25

## 2022-09-20 PROCEDURE — 36415 COLL VENOUS BLD VENIPUNCTURE: CPT

## 2022-09-20 NOTE — HISTORY OF PRESENT ILLNESS
[Disease: _____________________] : Disease: [unfilled] [de-identified] : Patient is a 60 year old COA f/u for a liver lesion and osteopenia. She has been taking Vitamin B12 for B12 deficiency. She has a history of Iron Deficiency Anemia s/p IV iron. \par \par Patient underwent lumpectomy in 2017 - found to have right breast density.  Breast biopsy revealed ADH.  She underwent excisional biopsy that showed ADH and LCIS.  [de-identified] : Patient presents today for follow up of LCIS/ADH B12 deficiency liver lesion and osteopenia- remains on Anastrozole. Patient offers no physical complaints at this time.

## 2022-09-20 NOTE — REVIEW OF SYSTEMS
[Anxiety] : anxiety [Negative] : Allergic/Immunologic [Fever] : no fever [Fatigue] : no fatigue [Recent Change In Weight] : ~T no recent weight change [Eye Pain] : no eye pain [Vision Problems] : no vision problems [Dysphagia] : no dysphagia [Hoarseness] : no hoarseness [Mucosal Pain] : no mucosal pain [Chest Pain] : no chest pain [Lower Ext Edema] : no lower extremity edema [Cough] : no cough [SOB on Exertion] : no shortness of breath during exertion [Abdominal Pain] : no abdominal pain [Constipation] : no constipation [Diarrhea] : no diarrhea [Dysuria] : no dysuria [Joint Pain] : no joint pain [Muscle Pain] : no muscle pain [Skin Rash] : no skin rash [Dizziness] : no dizziness [Fainting] : no fainting [Insomnia] : no insomnia [Depression] : no depression [Hot Flashes] : no hot flashes [Muscle Weakness] : no muscle weakness [Easy Bleeding] : no tendency for easy bleeding [Easy Bruising] : no tendency for easy bruising [de-identified] : work related

## 2022-09-20 NOTE — ASSESSMENT
[FreeTextEntry1] : 62 year old female with  LCIS with ADH.  2017\par - on exemestane, occasional muscle twinges - change to Anastrozole \par - mammogram last  2022\par - Discussed weight control and healthy eating habits.  Encourage to participate in moderate exercise.\par - gabapentin - 600 mg \par - Ongoing surveillance with imaging studies.\par \par -  B12 deficiency - check level, continue B12 1000mcg Q 4 weeks SQ.  No response to PO in the past\par \par Osteoporosis last bone density 2022\par T-score- 2.5 to -1.8\par Risk factors\par Recommended:\par 1. Vitamin D\par 2. Calcium supplement 500mg\par 3. Weight bearing exercises\par 4. Prolia resume. , 2021, 2021, 2022- (held) will resume now \par Dental cleaning - regular weight-bearing exercises, calcium and vitamin D\par \par Liver lesion - benign, fatty liver\par last  10/21- reviewed \par \par Genetic testing \par - [place]\par Patient wit LCIS age 57\par Mother - multiple myeloma - age 70, ovarian ca ? \par Father -  39 - accident\par Paternal uncle- age 60 Charcot- Juanita - Tooth syndrome \par Maternal uncle - CVA age 40,  young\par Maternal GF - laryngeal ca, smoker \par Brother - colon cancer age 40 \par \par Patient with tingling sensation in the tongue after Prolia for few weeks, followed by tingling sensation after COVID vaccine ( Moderna).  She was not taking calcium.  Start Calcium \par Genetic testing reviewed with patient - [facility] results with SMARCA4 VUS, patient evaluated by genetic counselor.   \par \par Case and mgmt discussed with Dr. Jensen- cbc cea, breast profile vit d, irons and b12 \par

## 2022-09-20 NOTE — PHYSICAL EXAM
[Fully active, able to carry on all pre-disease performance without restriction] : Status 0 - Fully active, able to carry on all pre-disease performance without restriction [Normal] : affect appropriate [de-identified] : right breast lumpectomy scar

## 2022-09-20 NOTE — CONSULT LETTER
[Dear  ___] : Dear  [unfilled], [Consult Letter:] : I had the pleasure of evaluating your patient, [unfilled]. [Please see my note below.] : Please see my note below. [Consult Closing:] : Thank you very much for allowing me to participate in the care of this patient.  If you have any questions, please do not hesitate to contact me. [Sincerely,] : Sincerely, [FreeTextEntry3] : Judy Jensen MD\par [facility] [place] at [facility]\par

## 2023-03-21 ENCOUNTER — RESULT REVIEW (OUTPATIENT)
Age: 63
End: 2023-03-21

## 2023-03-21 ENCOUNTER — APPOINTMENT (OUTPATIENT)
Dept: HEMATOLOGY ONCOLOGY | Facility: CLINIC | Age: 63
End: 2023-03-21
Payer: COMMERCIAL

## 2023-03-21 VITALS
TEMPERATURE: 98.1 F | OXYGEN SATURATION: 98 % | HEIGHT: 61.97 IN | HEART RATE: 65 BPM | SYSTOLIC BLOOD PRESSURE: 132 MMHG | BODY MASS INDEX: 30.42 KG/M2 | WEIGHT: 165.31 LBS | DIASTOLIC BLOOD PRESSURE: 67 MMHG | RESPIRATION RATE: 16 BRPM

## 2023-03-21 DIAGNOSIS — K76.9 LIVER DISEASE, UNSPECIFIED: ICD-10-CM

## 2023-03-21 PROCEDURE — 36415 COLL VENOUS BLD VENIPUNCTURE: CPT

## 2023-03-21 PROCEDURE — 99213 OFFICE O/P EST LOW 20 MIN: CPT | Mod: 25

## 2023-03-21 NOTE — PHYSICAL EXAM
[Fully active, able to carry on all pre-disease performance without restriction] : Status 0 - Fully active, able to carry on all pre-disease performance without restriction [Normal] : affect appropriate [de-identified] : right breast lumpectomy scar

## 2023-03-21 NOTE — REVIEW OF SYSTEMS
[Anxiety] : anxiety [Negative] : Allergic/Immunologic [Fatigue] : fatigue [Fever] : no fever [Recent Change In Weight] : ~T no recent weight change [Eye Pain] : no eye pain [Vision Problems] : no vision problems [Dysphagia] : no dysphagia [Hoarseness] : no hoarseness [Mucosal Pain] : no mucosal pain [Chest Pain] : no chest pain [Lower Ext Edema] : no lower extremity edema [Cough] : no cough [SOB on Exertion] : no shortness of breath during exertion [Abdominal Pain] : no abdominal pain [Constipation] : no constipation [Diarrhea] : no diarrhea [Dysuria] : no dysuria [Joint Pain] : no joint pain [Muscle Pain] : no muscle pain [Skin Rash] : no skin rash [Dizziness] : no dizziness [Fainting] : no fainting [Insomnia] : no insomnia [Depression] : no depression [Hot Flashes] : no hot flashes [Muscle Weakness] : no muscle weakness [Easy Bleeding] : no tendency for easy bleeding [Easy Bruising] : no tendency for easy bruising [de-identified] : work related

## 2023-03-21 NOTE — ASSESSMENT
[FreeTextEntry1] : 62 year old female with  LCIS with ADH.  2017\par - on exemestane, occasional muscle twinges - change to Anastrozole- tolerates well \par - mammogram last  2023\par - Discussed weight control and healthy eating habits.  Encourage to participate in moderate exercise.\par - gabapentin - 600 mg \par - Ongoing surveillance with imaging studies.\par \par -  B12 deficiency - check level, continue B12 1000mcg Q 4 weeks SQ.  No response to PO in the past- 654 at PCP 2023\par \par Osteoporosis last bone density 2022\par T-score- 2.5 to -1.8\par Risk factors\par Recommended:\par 1. Vitamin D\par 2. Calcium supplement 500mg\par 3. Weight bearing exercises\par 4. Prolia resume. , 2021, 2021, 2022- (held) will resume now \par Dental cleaning - regular weight-bearing exercises, calcium and vitamin D\par \par Liver lesion - benign, fatty liver\par last  10/21- reviewed \par \par Genetic testing \par - [place]\par Patient wit LCIS age 57\par Mother - multiple myeloma - age 70, ovarian ca ? \par Father -  39 - accident\par Paternal uncle- age 60 Charcot- Juanita - Tooth syndrome \par Maternal uncle - CVA age 40,  young\par Maternal GF - laryngeal ca, smoker \par Brother - colon cancer age 40 \par \par Patient with tingling sensation in the tongue after Prolia for few weeks, followed by tingling sensation after COVID vaccine ( Moderna).  She was not taking calcium.  Start Calcium \par Genetic testing reviewed with patient - [facility] results with SMARCA4 VUS, patient evaluated by genetic counselor.   \par \par Case and mgmt discussed with Dr. Jensen- cbc cea, breast profile vit d, irons and b12 \par

## 2023-03-21 NOTE — HISTORY OF PRESENT ILLNESS
[Disease: _____________________] : Disease: [unfilled] [de-identified] : Patient is a 60 year old COA f/u for a liver lesion and osteopenia. She has been taking Vitamin B12 for B12 deficiency. She has a history of Iron Deficiency Anemia s/p IV iron. \par \par Patient underwent lumpectomy in 2017 - found to have right breast density.  Breast biopsy revealed ADH.  She underwent excisional biopsy that showed ADH and LCIS.  [de-identified] : Patient presents today for follow up of LCIS/ADH B12 deficiency liver lesion and osteopenia- remains on Anastrozole. Patient offers no physical complaints at this time.

## 2023-03-21 NOTE — CONSULT LETTER
[Dear  ___] : Dear  [unfilled], [Consult Letter:] : I had the pleasure of evaluating your patient, [unfilled]. [Please see my note below.] : Please see my note below. [Consult Closing:] : Thank you very much for allowing me to participate in the care of this patient.  If you have any questions, please do not hesitate to contact me. [Sincerely,] : Sincerely, [FreeTextEntry3] : Judy Jensen MD\par [facility] [place] at [facility]\par

## 2023-09-08 NOTE — HISTORY OF PRESENT ILLNESS
[Disease: _____________________] : Disease: [unfilled] [de-identified] : Patient is a 60 year old COA f/u for a liver lesion and osteopenia. She has been taking Vitamin B12 for B12 deficiency. She has a history of Iron Deficiency Anemia s/p IV iron. \par  \par  Patient underwent lumpectomy in 2017 - found to have right breast density.  Breast biopsy revealed ADH.  She underwent excisional biopsy that showed ADH and LCIS.  [de-identified] : Patient presents today for follow up of LCIS/ADH B12 deficiency liver lesion and osteopenia- remains on Anastrozole. Patient offers no physical complaints at this time.

## 2023-09-08 NOTE — REVIEW OF SYSTEMS
[Fever] : no fever [Fatigue] : fatigue [Recent Change In Weight] : ~T no recent weight change [Eye Pain] : no eye pain [Vision Problems] : no vision problems [Dysphagia] : no dysphagia [Hoarseness] : no hoarseness [Mucosal Pain] : no mucosal pain [Chest Pain] : no chest pain [Lower Ext Edema] : no lower extremity edema [Cough] : no cough [SOB on Exertion] : no shortness of breath during exertion [Abdominal Pain] : no abdominal pain [Constipation] : no constipation [Dysuria] : no dysuria [Joint Pain] : no joint pain [Muscle Pain] : no muscle pain [Skin Rash] : no skin rash [Dizziness] : no dizziness [Fainting] : no fainting [Insomnia] : no insomnia [Anxiety] : anxiety [Depression] : no depression [Hot Flashes] : no hot flashes [Muscle Weakness] : no muscle weakness [Easy Bleeding] : no tendency for easy bleeding [Easy Bruising] : no tendency for easy bruising [Negative] : Allergic/Immunologic [de-identified] : work related

## 2023-09-08 NOTE — ASSESSMENT
[FreeTextEntry1] : 62 year old female with  LCIS with ADH.  2017\par  - on exemestane, occasional muscle twinges - change to Anastrozole- tolerates well \par  - mammogram last  2023\par  - Discussed weight control and healthy eating habits.  Encourage to participate in moderate exercise.\par  - gabapentin - 600 mg \par  - Ongoing surveillance with imaging studies.\par  \par  -  B12 deficiency - check level, continue B12 1000mcg Q 4 weeks SQ.  No response to PO in the past- 654 at PCP 2023\par  \par  Osteoporosis last bone density 2022\par  T-score- 2.5 to -1.8\par  Risk factors\par  Recommended:\par  1. Vitamin D\par  2. Calcium supplement 500mg\par  3. Weight bearing exercises\par  4. Prolia resume. , 2021, 2021, 2022- (held) will resume now \par  Dental cleaning - regular weight-bearing exercises, calcium and vitamin D\par  \par  Liver lesion - benign, fatty liver\par  last  10/21- reviewed \par  \par  Genetic testing \par  - [place]\par  Patient wit LCIS age 57\par  Mother - multiple myeloma - age 70, ovarian ca ? \par  Father -  39 - accident\par  Paternal uncle- age 60 Charcot- Juanita - Tooth syndrome \par  Maternal uncle - CVA age 40,  young\par  Maternal GF - laryngeal ca, smoker \par  Brother - colon cancer age 40 \par  \par  Patient with tingling sensation in the tongue after Prolia for few weeks, followed by tingling sensation after COVID vaccine ( Moderna).  She was not taking calcium.  Start Calcium \par  Genetic testing reviewed with patient - [facility] results with SMARCA4 VUS, patient evaluated by genetic counselor.   \par  \par  Case and mgmt discussed with Dr. Jensen- cbc cea, breast profile vit d, irons and b12 \par

## 2023-09-08 NOTE — PHYSICAL EXAM
[Fully active, able to carry on all pre-disease performance without restriction] : Status 0 - Fully active, able to carry on all pre-disease performance without restriction [Normal] : affect appropriate [de-identified] : right breast lumpectomy scar

## 2023-09-08 NOTE — CONSULT LETTER
[Dear  ___] : Dear  [unfilled], [Consult Letter:] : I had the pleasure of evaluating your patient, [unfilled]. [Please see my note below.] : Please see my note below. [Consult Closing:] : Thank you very much for allowing me to participate in the care of this patient.  If you have any questions, please do not hesitate to contact me. [Sincerely,] : Sincerely, [FreeTextEntry3] : Judy Jensen MD\par  [facility] [place] at [facility]\par

## 2023-09-12 ENCOUNTER — RESULT REVIEW (OUTPATIENT)
Age: 63
End: 2023-09-12

## 2023-09-12 ENCOUNTER — APPOINTMENT (OUTPATIENT)
Dept: HEMATOLOGY ONCOLOGY | Facility: CLINIC | Age: 63
End: 2023-09-12
Payer: COMMERCIAL

## 2023-09-12 VITALS
TEMPERATURE: 96.9 F | HEIGHT: 61.97 IN | WEIGHT: 166 LBS | HEART RATE: 58 BPM | DIASTOLIC BLOOD PRESSURE: 69 MMHG | RESPIRATION RATE: 16 BRPM | SYSTOLIC BLOOD PRESSURE: 130 MMHG | BODY MASS INDEX: 30.55 KG/M2 | OXYGEN SATURATION: 97 %

## 2023-09-12 DIAGNOSIS — R30.0 DYSURIA: ICD-10-CM

## 2023-09-12 PROCEDURE — 99214 OFFICE O/P EST MOD 30 MIN: CPT | Mod: 25

## 2023-09-12 PROCEDURE — 36415 COLL VENOUS BLD VENIPUNCTURE: CPT

## 2023-09-12 RX ORDER — ATORVASTATIN CALCIUM 20 MG/1
20 TABLET, FILM COATED ORAL
Refills: 0 | Status: ACTIVE | COMMUNITY

## 2023-09-12 RX ORDER — ANASTROZOLE TABLETS 1 MG/1
1 TABLET ORAL DAILY
Qty: 90 | Refills: 3 | Status: ACTIVE | COMMUNITY
Start: 2019-04-10 | End: 1900-01-01

## 2023-12-07 RX ORDER — GABAPENTIN 300 MG/1
300 CAPSULE ORAL
Qty: 90 | Refills: 2 | Status: ACTIVE | COMMUNITY
Start: 1900-01-01 | End: 1900-01-01

## 2024-03-11 ENCOUNTER — RX RENEWAL (OUTPATIENT)
Age: 64
End: 2024-03-11

## 2024-03-12 ENCOUNTER — RESULT REVIEW (OUTPATIENT)
Age: 64
End: 2024-03-12

## 2024-03-12 ENCOUNTER — APPOINTMENT (OUTPATIENT)
Dept: HEMATOLOGY ONCOLOGY | Facility: CLINIC | Age: 64
End: 2024-03-12
Payer: COMMERCIAL

## 2024-03-12 VITALS
OXYGEN SATURATION: 98 % | BODY MASS INDEX: 31.47 KG/M2 | SYSTOLIC BLOOD PRESSURE: 130 MMHG | TEMPERATURE: 97 F | WEIGHT: 171 LBS | RESPIRATION RATE: 16 BRPM | HEIGHT: 61.97 IN | HEART RATE: 64 BPM | DIASTOLIC BLOOD PRESSURE: 70 MMHG

## 2024-03-12 DIAGNOSIS — D51.9 VITAMIN B12 DEFICIENCY ANEMIA, UNSPECIFIED: ICD-10-CM

## 2024-03-12 DIAGNOSIS — R07.89 OTHER CHEST PAIN: ICD-10-CM

## 2024-03-12 DIAGNOSIS — D05.01 LOBULAR CARCINOMA IN SITU OF RIGHT BREAST: ICD-10-CM

## 2024-03-12 DIAGNOSIS — M85.80 OTHER SPECIFIED DISORDERS OF BONE DENSITY AND STRUCTURE, UNSPECIFIED SITE: ICD-10-CM

## 2024-03-12 DIAGNOSIS — M81.8 OTHER OSTEOPOROSIS W/OUT CURRENT PATHOLOGICAL FRACTURE: ICD-10-CM

## 2024-03-12 PROCEDURE — 99214 OFFICE O/P EST MOD 30 MIN: CPT

## 2024-03-12 PROCEDURE — 36415 COLL VENOUS BLD VENIPUNCTURE: CPT

## 2024-03-12 RX ORDER — SYRINGE WITH NEEDLE, 1 ML 25GX5/8"
25G X 5/8" SYRINGE, EMPTY DISPOSABLE MISCELLANEOUS
Qty: 20 | Refills: 1 | Status: ACTIVE | COMMUNITY
Start: 1900-01-01 | End: 1900-01-01

## 2024-03-12 RX ORDER — CYANOCOBALAMIN 1000 UG/ML
1000 INJECTION INTRAMUSCULAR; SUBCUTANEOUS
Qty: 3 | Refills: 3 | Status: ACTIVE | COMMUNITY
Start: 1900-01-01 | End: 1900-01-01

## 2024-03-12 NOTE — CONSULT LETTER
[Consult Letter:] : I had the pleasure of evaluating your patient, [unfilled]. [Dear  ___] : Dear  [unfilled], [Please see my note below.] : Please see my note below. [Sincerely,] : Sincerely, [Consult Closing:] : Thank you very much for allowing me to participate in the care of this patient.  If you have any questions, please do not hesitate to contact me. [FreeTextEntry3] : Judy Jensen MD\par  [facility] [place] at [facility]\par

## 2024-03-12 NOTE — ASSESSMENT
[FreeTextEntry1] : 63 year old female with  LCIS with ADH.  2017 - on exemestane, occasional muscle twinges - change to Anastrozole- tolerates well  - mammogram last  2023 - Discussed weight control and healthy eating habits.  Encourage to participate in moderate exercise. - gabapentin - 600 mg  - Ongoing surveillance with imaging studies.  -  B12 deficiency - check level, continue B12 1000mcg Q 4 weeks SQ.  No response to PO in the past- 654 at PCP 2023  Osteoporosis last bone density 2022 T-score- 2.5 to -1.8 Risk factors Recommended: 1. Vitamin D 2. Calcium supplement 500mg 3. Weight bearing exercises 4. Prolia resume. , 2021, 2021, 2022-, , # 6- , # 7- 3/24 Dental cleaning - regular weight-bearing exercises, calcium and vitamin D Dental check up Q 4 months  Liver lesion - benign, fatty liver last US 10/21- reviewed   Genetic testing  - [place] Patient wit LCIS age 57 Mother - multiple myeloma - age 70, ovarian ca ?  Father -  39 - accident Paternal uncle- age 60 Charcot- Juanita - Tooth syndrome  Maternal uncle - CVA age 40,  young Maternal GF - laryngeal ca, smoker  Brother - colon cancer age 40   Patient with tingling sensation in the tongue after Prolia for few weeks, followed by tingling sensation after COVID vaccine ( Moderna).  She was not taking calcium.  Start Calcium  Genetic testing reviewed with patient - [facility] results with SMARCA4 VUS, patient evaluated by genetic counselor.     UTI - order UA - cbc cea, breast profile vit d, irons and b12   # Chest tighness with arm pain - cardiology evaluation, EKG

## 2024-03-12 NOTE — PHYSICAL EXAM
[Fully active, able to carry on all pre-disease performance without restriction] : Status 0 - Fully active, able to carry on all pre-disease performance without restriction [Normal] : affect appropriate [de-identified] : right breast lumpectomy scar

## 2024-03-12 NOTE — HISTORY OF PRESENT ILLNESS
[Disease: _____________________] : Disease: [unfilled] [de-identified] : Patient is a 60 year old COA f/u for a liver lesion and osteopenia. She has been taking Vitamin B12 for B12 deficiency. She has a history of Iron Deficiency Anemia s/p IV iron. \par  \par  Patient underwent lumpectomy in 2017 - found to have right breast density.  Breast biopsy revealed ADH.  She underwent excisional biopsy that showed ADH and LCIS.  [de-identified] : Patient presents today for follow up of LCIS/ADH B12 deficiency liver lesion and osteopenia- patient is still on anastrozole. Tolerating it well. She underwent a breast MRI on August 22nd, 2023. She offer no complaints at this time.

## 2024-03-12 NOTE — REVIEW OF SYSTEMS
[Fatigue] : fatigue [Anxiety] : anxiety [Negative] : Allergic/Immunologic [Fever] : no fever [Eye Pain] : no eye pain [Recent Change In Weight] : ~T no recent weight change [Dysphagia] : no dysphagia [Vision Problems] : no vision problems [Hoarseness] : no hoarseness [Mucosal Pain] : no mucosal pain [Chest Pain] : no chest pain [Lower Ext Edema] : no lower extremity edema [SOB on Exertion] : no shortness of breath during exertion [Abdominal Pain] : no abdominal pain [Cough] : no cough [Dysuria] : no dysuria [Constipation] : no constipation [Muscle Pain] : no muscle pain [Joint Pain] : no joint pain [Dizziness] : no dizziness [Skin Rash] : no skin rash [Fainting] : no fainting [Insomnia] : no insomnia [Hot Flashes] : no hot flashes [Depression] : no depression [Muscle Weakness] : no muscle weakness [Easy Bleeding] : no tendency for easy bleeding [de-identified] : work related [Easy Bruising] : no tendency for easy bruising

## 2024-03-18 ENCOUNTER — NON-APPOINTMENT (OUTPATIENT)
Age: 64
End: 2024-03-18

## 2024-11-21 ENCOUNTER — APPOINTMENT (OUTPATIENT)
Dept: HEMATOLOGY ONCOLOGY | Facility: CLINIC | Age: 64
End: 2024-11-21
Payer: COMMERCIAL

## 2024-11-21 ENCOUNTER — RESULT REVIEW (OUTPATIENT)
Age: 64
End: 2024-11-21

## 2024-11-21 ENCOUNTER — APPOINTMENT (OUTPATIENT)
Dept: HEMATOLOGY ONCOLOGY | Facility: CLINIC | Age: 64
End: 2024-11-21

## 2024-11-21 VITALS
OXYGEN SATURATION: 97 % | RESPIRATION RATE: 16 BRPM | BODY MASS INDEX: 31.74 KG/M2 | HEIGHT: 61.97 IN | SYSTOLIC BLOOD PRESSURE: 156 MMHG | TEMPERATURE: 96.9 F | HEART RATE: 67 BPM | DIASTOLIC BLOOD PRESSURE: 78 MMHG | WEIGHT: 172.5 LBS

## 2024-11-21 DIAGNOSIS — D51.9 VITAMIN B12 DEFICIENCY ANEMIA, UNSPECIFIED: ICD-10-CM

## 2024-11-21 DIAGNOSIS — M81.8 OTHER OSTEOPOROSIS W/OUT CURRENT PATHOLOGICAL FRACTURE: ICD-10-CM

## 2024-11-21 DIAGNOSIS — D05.01 LOBULAR CARCINOMA IN SITU OF RIGHT BREAST: ICD-10-CM

## 2024-11-21 PROCEDURE — 36415 COLL VENOUS BLD VENIPUNCTURE: CPT

## 2024-11-21 PROCEDURE — 99214 OFFICE O/P EST MOD 30 MIN: CPT

## 2025-01-02 ENCOUNTER — RESULT REVIEW (OUTPATIENT)
Age: 65
End: 2025-01-02

## 2025-01-02 ENCOUNTER — APPOINTMENT (OUTPATIENT)
Dept: PAIN MANAGEMENT | Facility: CLINIC | Age: 65
End: 2025-01-02
Payer: COMMERCIAL

## 2025-01-02 VITALS
SYSTOLIC BLOOD PRESSURE: 161 MMHG | WEIGHT: 172 LBS | DIASTOLIC BLOOD PRESSURE: 92 MMHG | HEIGHT: 61.97 IN | BODY MASS INDEX: 31.65 KG/M2

## 2025-01-02 DIAGNOSIS — M54.16 RADICULOPATHY, LUMBAR REGION: ICD-10-CM

## 2025-01-02 DIAGNOSIS — M48.062 SPINAL STENOSIS, LUMBAR REGION WITH NEUROGENIC CLAUDICATION: ICD-10-CM

## 2025-01-02 DIAGNOSIS — M79.18 MYALGIA, OTHER SITE: ICD-10-CM

## 2025-01-02 DIAGNOSIS — M48.02 SPINAL STENOSIS, CERVICAL REGION: ICD-10-CM

## 2025-01-02 DIAGNOSIS — M54.12 RADICULOPATHY, CERVICAL REGION: ICD-10-CM

## 2025-01-02 DIAGNOSIS — G89.4 CHRONIC PAIN SYNDROME: ICD-10-CM

## 2025-01-02 PROCEDURE — 99205 OFFICE O/P NEW HI 60 MIN: CPT

## 2025-01-02 RX ORDER — METHYLPREDNISOLONE 4 MG/1
4 TABLET ORAL
Qty: 1 | Refills: 0 | Status: ACTIVE | COMMUNITY
Start: 2025-01-02 | End: 1900-01-01

## 2025-01-02 RX ORDER — TIZANIDINE 2 MG/1
2 TABLET ORAL
Qty: 45 | Refills: 1 | Status: ACTIVE | COMMUNITY
Start: 2025-01-02 | End: 1900-01-01

## 2025-01-21 ENCOUNTER — APPOINTMENT (OUTPATIENT)
Dept: PAIN MANAGEMENT | Facility: CLINIC | Age: 65
End: 2025-01-21
Payer: COMMERCIAL

## 2025-01-21 VITALS
SYSTOLIC BLOOD PRESSURE: 129 MMHG | BODY MASS INDEX: 31.65 KG/M2 | WEIGHT: 172 LBS | DIASTOLIC BLOOD PRESSURE: 82 MMHG | HEART RATE: 93 BPM | HEIGHT: 61.97 IN | OXYGEN SATURATION: 96 %

## 2025-01-21 DIAGNOSIS — M25.551 PAIN IN RIGHT HIP: ICD-10-CM

## 2025-01-21 DIAGNOSIS — M48.02 SPINAL STENOSIS, CERVICAL REGION: ICD-10-CM

## 2025-01-21 DIAGNOSIS — M81.8 OTHER OSTEOPOROSIS W/OUT CURRENT PATHOLOGICAL FRACTURE: ICD-10-CM

## 2025-01-21 DIAGNOSIS — M25.561 PAIN IN RIGHT HIP: ICD-10-CM

## 2025-01-21 DIAGNOSIS — G89.29 PAIN IN RIGHT HIP: ICD-10-CM

## 2025-01-21 DIAGNOSIS — M54.16 RADICULOPATHY, LUMBAR REGION: ICD-10-CM

## 2025-01-21 DIAGNOSIS — M54.12 RADICULOPATHY, CERVICAL REGION: ICD-10-CM

## 2025-01-21 DIAGNOSIS — G89.4 CHRONIC PAIN SYNDROME: ICD-10-CM

## 2025-01-21 DIAGNOSIS — R07.89 OTHER CHEST PAIN: ICD-10-CM

## 2025-01-21 DIAGNOSIS — M25.552 PAIN IN RIGHT HIP: ICD-10-CM

## 2025-01-21 DIAGNOSIS — M25.562 PAIN IN RIGHT HIP: ICD-10-CM

## 2025-01-21 PROCEDURE — 99214 OFFICE O/P EST MOD 30 MIN: CPT

## 2025-01-21 PROCEDURE — G2211 COMPLEX E/M VISIT ADD ON: CPT | Mod: NC

## 2025-03-25 ENCOUNTER — APPOINTMENT (OUTPATIENT)
Dept: PAIN MANAGEMENT | Facility: CLINIC | Age: 65
End: 2025-03-25
Payer: COMMERCIAL

## 2025-03-25 VITALS
SYSTOLIC BLOOD PRESSURE: 149 MMHG | BODY MASS INDEX: 32.47 KG/M2 | HEIGHT: 61 IN | DIASTOLIC BLOOD PRESSURE: 83 MMHG | WEIGHT: 172 LBS

## 2025-03-25 DIAGNOSIS — G89.4 CHRONIC PAIN SYNDROME: ICD-10-CM

## 2025-03-25 DIAGNOSIS — M54.12 RADICULOPATHY, CERVICAL REGION: ICD-10-CM

## 2025-03-25 DIAGNOSIS — M79.18 MYALGIA, OTHER SITE: ICD-10-CM

## 2025-03-25 DIAGNOSIS — M54.16 RADICULOPATHY, LUMBAR REGION: ICD-10-CM

## 2025-03-25 DIAGNOSIS — M48.062 SPINAL STENOSIS, LUMBAR REGION WITH NEUROGENIC CLAUDICATION: ICD-10-CM

## 2025-03-25 DIAGNOSIS — M48.02 SPINAL STENOSIS, CERVICAL REGION: ICD-10-CM

## 2025-03-25 PROCEDURE — G2211 COMPLEX E/M VISIT ADD ON: CPT | Mod: NC

## 2025-03-25 PROCEDURE — 99214 OFFICE O/P EST MOD 30 MIN: CPT

## 2025-04-11 ENCOUNTER — APPOINTMENT (OUTPATIENT)
Dept: PAIN MANAGEMENT | Facility: CLINIC | Age: 65
End: 2025-04-11
Payer: COMMERCIAL

## 2025-04-11 VITALS
HEART RATE: 73 BPM | OXYGEN SATURATION: 96 % | RESPIRATION RATE: 16 BRPM | DIASTOLIC BLOOD PRESSURE: 83 MMHG | SYSTOLIC BLOOD PRESSURE: 145 MMHG

## 2025-04-11 DIAGNOSIS — M48.02 SPINAL STENOSIS, CERVICAL REGION: ICD-10-CM

## 2025-04-11 PROCEDURE — 62321 NJX INTERLAMINAR CRV/THRC: CPT

## 2025-04-11 RX ADMIN — LIDOCAINE HYDROCHLORIDE %: 10 INJECTION, SOLUTION INFILTRATION; PERINEURAL at 00:00

## 2025-04-11 RX ADMIN — IOHEXOL 0 MG/ML: 180 INJECTION INTRAVENOUS at 00:00

## 2025-04-11 RX ADMIN — TRIAMCINOLONE ACETONIDE 0 MG/ML: 80 INJECTION, SUSPENSION INTRA-ARTICULAR; INTRAMUSCULAR at 00:00

## 2025-04-29 ENCOUNTER — APPOINTMENT (OUTPATIENT)
Dept: PAIN MANAGEMENT | Facility: CLINIC | Age: 65
End: 2025-04-29
Payer: COMMERCIAL

## 2025-04-29 VITALS
BODY MASS INDEX: 32.1 KG/M2 | DIASTOLIC BLOOD PRESSURE: 89 MMHG | WEIGHT: 170 LBS | HEART RATE: 64 BPM | TEMPERATURE: 98.7 F | OXYGEN SATURATION: 98 % | RESPIRATION RATE: 16 BRPM | SYSTOLIC BLOOD PRESSURE: 142 MMHG | HEIGHT: 61 IN

## 2025-04-29 DIAGNOSIS — M48.062 SPINAL STENOSIS, LUMBAR REGION WITH NEUROGENIC CLAUDICATION: ICD-10-CM

## 2025-04-29 DIAGNOSIS — M54.12 RADICULOPATHY, CERVICAL REGION: ICD-10-CM

## 2025-04-29 DIAGNOSIS — M79.18 MYALGIA, OTHER SITE: ICD-10-CM

## 2025-04-29 DIAGNOSIS — G89.4 CHRONIC PAIN SYNDROME: ICD-10-CM

## 2025-04-29 DIAGNOSIS — M48.02 SPINAL STENOSIS, CERVICAL REGION: ICD-10-CM

## 2025-04-29 DIAGNOSIS — M54.16 RADICULOPATHY, LUMBAR REGION: ICD-10-CM

## 2025-04-29 PROCEDURE — 99214 OFFICE O/P EST MOD 30 MIN: CPT

## 2025-04-29 PROCEDURE — G2211 COMPLEX E/M VISIT ADD ON: CPT | Mod: NC

## 2025-05-22 ENCOUNTER — RESULT REVIEW (OUTPATIENT)
Age: 65
End: 2025-05-22

## 2025-05-22 ENCOUNTER — APPOINTMENT (OUTPATIENT)
Dept: HEMATOLOGY ONCOLOGY | Facility: CLINIC | Age: 65
End: 2025-05-22
Payer: COMMERCIAL

## 2025-05-22 VITALS
TEMPERATURE: 97.2 F | WEIGHT: 173.06 LBS | BODY MASS INDEX: 32.67 KG/M2 | HEART RATE: 57 BPM | OXYGEN SATURATION: 98 % | SYSTOLIC BLOOD PRESSURE: 138 MMHG | HEIGHT: 61 IN | RESPIRATION RATE: 16 BRPM | DIASTOLIC BLOOD PRESSURE: 72 MMHG

## 2025-05-22 DIAGNOSIS — D05.01 LOBULAR CARCINOMA IN SITU OF RIGHT BREAST: ICD-10-CM

## 2025-05-22 DIAGNOSIS — M81.8 OTHER OSTEOPOROSIS W/OUT CURRENT PATHOLOGICAL FRACTURE: ICD-10-CM

## 2025-05-22 DIAGNOSIS — D51.9 VITAMIN B12 DEFICIENCY ANEMIA, UNSPECIFIED: ICD-10-CM

## 2025-05-22 PROCEDURE — 99214 OFFICE O/P EST MOD 30 MIN: CPT

## 2025-05-22 PROCEDURE — 36415 COLL VENOUS BLD VENIPUNCTURE: CPT

## 2025-07-09 ENCOUNTER — APPOINTMENT (OUTPATIENT)
Dept: PAIN MANAGEMENT | Facility: CLINIC | Age: 65
End: 2025-07-09
Payer: COMMERCIAL

## 2025-07-09 PROCEDURE — G2211 COMPLEX E/M VISIT ADD ON: CPT | Mod: NC,95

## 2025-07-09 PROCEDURE — 99214 OFFICE O/P EST MOD 30 MIN: CPT | Mod: 95
